# Patient Record
(demographics unavailable — no encounter records)

---

## 2025-01-24 NOTE — HISTORY OF PRESENT ILLNESS
[No Personal or Family History of Easy Bruising, Bleeding, or Issues with General Anesthesia] : No Personal or Family History of easy bruising, bleeding, or issues with general anesthesia [de-identified] : Today I had the pleasure of seeing AMI ARAGON. AMI is a 8 year boy who presents for: Halitosis History was obtained from patient, parent and chart.   Complaining of bad breath for a few months worse after school, does not improve with brushing teeth Was evaluated by dentist and wanted to check his tonsils  No recent throat infections  Light occasional snoring. No pausing, choking or gasping  Chronic nasal congestion. Has never used Flonase   Epistaxis twice per month for 3-4 years, worse in the winter Last nosebleed was December  Picks his nose  Bleeds for 5 minutes  L>R No fevers, weight loss, night sweats, chills   No recent ear infections  No concerns for hearing or speech  Passed [facility]

## 2025-01-24 NOTE — CONSULT LETTER
[Dear  ___] : Dear  [unfilled], [Courtesy Letter:] : I had the pleasure of seeing your patient, [unfilled], in my office today. [Please see my note below.] : Please see my note below. [Consult Closing:] : Thank you very much for allowing me to participate in the care of this patient.  If you have any questions, please do not hesitate to contact me. [Sincerely,] : Sincerely, [FreeTextEntry2] : Dr. Lydia Maria  [address] [place] [id number] [phone] [FreeTextEntry3] : Brianda Hardin MD Pediatric Otolaryngology / Head and Neck Surgery

## 2025-01-24 NOTE — ASSESSMENT
[FreeTextEntry1] : AMI is a 8 year old boy presenting for halitosis, epistaxis  epistaxis - reassurance, ayr gel, avoid picking  halitosis - discussed multiple possible etiologies, no abnormal findings on exam today, trial gargle salt water prior to brushing teeth  EDUCATION FOR FAMILY Most nosebleeds start from the front of the nose on the septum (the part of the nose that divides it into a right and left side).  The skin on the septum can dry out and crack, exposing blood vessels which then bleed.  Nosebleeds in children often are triggered by nose picking, sticking something in the nose, or repeated nose blowing.  It is not uncommon for children to have a nosebleed in their sleep.  Nosebleeds are messy, but rarely serious.  Most nosebleeds stop on their own.   How to heal the nose and prevent a nosebleed:  STEP 1: WHEN THE NOSE IS ACTIVELY BLEEDING -Have your child lean forward to prevent swallowing blood.  Swallowing too much blood can make your child feel sick and vomit. -Pinch the nostrils closed with moderate pressure for 5-10 minutes. No cheating! Set a timer, it takes a LONG time.  Your child will need to breathe through their mouth -If still bleeding give it another 5-10 minutes - Don't blow out the clot! It's doing its job - Afrin- Afrin (oxymetazoline) is an over-the-counter nasal decongestant that some people use to help stop ACTIVE bleeding. PLEASE BE CAUTIONED: IT SHOULD NOT BE USED more than 3 days or if you have heart or blood pressure issues.   - If you are concerned about the amount of bleeding or cannot control it at home, please take your child to the emergency room.  STEP 2: TWO WEEKS FOLLOWING A NOSE BLEED -Nasal Saline Spray-Saltwater spray (e.g. Ocean Spray Nasal Saline) helps moisturize and heal the nose. It can be found over-the-counter at your local drug store. [place] 1-2 sprays up each side of the nose 4 to 8 times per day.  You can't overuse saltwater spray. -Moisturizers-AYR gel is a saline/saltwater gel found over the counter that can be used 2 times per day on the inside of the nostrils to keep the nose moist between saltwater sprays.  Alternately, some people prefer using Aquaphor or Cocoa Butter Cream -Bedside Humidifier-Dry night air can be particularly hard on the nose.  Consider getting a bedside humidifier to use while sleeping. -Mupirocin Ointment-Mupirocin (Bactroban) is a prescription antibacterial ointment that may be prescribed in some cases to help heal the front of the nose.  If prescribed, use twice daily inside the nostrils for 1 week. - Avoid nose blowing, sneeze with the mouth open - For 2 weeks after a nose bleed your child is at high risk for another nose bleed  STEP 3: PREVENTATIVE MEDICINE - No nose picking!  Keep all fingers and other objects out of your child's nose.if possible! - Bedside Humidifier-Dry night air can be particularly hard on the nose.  Consider getting a bedside humidifier to use while sleeping. - Nose bleeds are often worse in the winter, you can use saline spray or ayr gel preventatively